# Patient Record
Sex: MALE | Race: WHITE | NOT HISPANIC OR LATINO | Employment: FULL TIME | URBAN - METROPOLITAN AREA
[De-identification: names, ages, dates, MRNs, and addresses within clinical notes are randomized per-mention and may not be internally consistent; named-entity substitution may affect disease eponyms.]

---

## 2022-05-09 ENCOUNTER — CLINICAL SUPPORT (OUTPATIENT)
Dept: SMOKING CESSATION | Facility: CLINIC | Age: 27
End: 2022-05-09

## 2022-05-09 DIAGNOSIS — F17.200 NICOTINE DEPENDENCE: Primary | ICD-10-CM

## 2022-05-09 PROCEDURE — 99404 PR PREVENT COUNSEL,INDIV,60 MIN: ICD-10-PCS | Mod: S$GLB,,, | Performed by: GENERAL PRACTICE

## 2022-05-09 PROCEDURE — 99404 PREV MED CNSL INDIV APPRX 60: CPT | Mod: S$GLB,,, | Performed by: GENERAL PRACTICE

## 2022-05-09 RX ORDER — VARENICLINE TARTRATE 1 MG/1
1 TABLET, FILM COATED ORAL 2 TIMES DAILY
Qty: 56 TABLET | Refills: 0 | Status: SHIPPED | OUTPATIENT
Start: 2022-05-09

## 2022-05-09 RX ORDER — VARENICLINE TARTRATE 0.5 MG/1
TABLET, FILM COATED ORAL
Qty: 11 TABLET | Refills: 0 | Status: SHIPPED | OUTPATIENT
Start: 2022-05-09

## 2022-05-23 ENCOUNTER — CLINICAL SUPPORT (OUTPATIENT)
Dept: SMOKING CESSATION | Facility: CLINIC | Age: 27
End: 2022-05-23

## 2022-05-23 DIAGNOSIS — F17.200 NICOTINE DEPENDENCE: Primary | ICD-10-CM

## 2022-05-23 PROCEDURE — 99403 PR PREVENT COUNSEL,INDIV,45 MIN: ICD-10-PCS | Mod: S$GLB,,, | Performed by: GENERAL PRACTICE

## 2022-05-23 PROCEDURE — 99403 PREV MED CNSL INDIV APPRX 45: CPT | Mod: S$GLB,,, | Performed by: GENERAL PRACTICE

## 2022-05-23 NOTE — PROGRESS NOTES
Individual Follow-Up Form    5/23/2022    Clinical Status of Patient: Outpatient    Length of Service: 45 minutes    Continuing Medication: yes  Chantix      Target Symptoms: Withdrawal and medication side effects. The following were  rated moderate (3) to severe (4) on TCRS:  · Moderate (3): none reported  · Severe (4): none reported    Comments: Spoke with patient at length in clinic regarding tobacco cessation progress. Patient remains on prescribed tobacco cessation medication 1mg Chantix without any negative side effects at this time.  Patient decreased to 6 cigarettes daily.  Patient will cut down to 5 cigarettes a day this week.  Counselor reviewed strategies, habitual behavior, high risks situations, understanding urges and cravings, stress and relaxation with open discussion and additional interventions, Introduced lapses, relapses, understanding them and analyzing the situation of a lapse, conflict issues that may be linked to a lapse.  Counselor suggested the patient set a daily smoking limit to help decrease the number of cigarettes he smokes daily.  Counselor also suggested staying hydrated and eating fruits and vegetables to help decrease nicotine cravings.  Patient appeared to be receptive to the information given.  Counselor will continue to motivate patient to be tobacco free.    Diagnosis: F17.200    Next Visit: 1 week

## 2022-06-06 ENCOUNTER — CLINICAL SUPPORT (OUTPATIENT)
Dept: SMOKING CESSATION | Facility: CLINIC | Age: 27
End: 2022-06-06

## 2022-06-06 DIAGNOSIS — F17.200 NICOTINE DEPENDENCE: Primary | ICD-10-CM

## 2022-06-06 PROCEDURE — 99403 PR PREVENT COUNSEL,INDIV,45 MIN: ICD-10-PCS | Mod: S$GLB,,, | Performed by: GENERAL PRACTICE

## 2022-06-06 PROCEDURE — 99403 PREV MED CNSL INDIV APPRX 45: CPT | Mod: S$GLB,,, | Performed by: GENERAL PRACTICE

## 2022-06-06 NOTE — PROGRESS NOTES
Individual Follow-Up Form    6/6/2022     Quit Date: 6/2/2022    Clinical Status of Patient: Outpatient    Length of Service: 45 minutes    Continuing Medication: yes  Chantix     Target Symptoms: Withdrawal and medication side effects. The following were  rated moderate (3) to severe (4) on TCRS:  · Moderate (3): none reported  · Severe (4): none reported    Comments: Spoke with patient at length in clinic regarding tobacco cessation progress. Patient remains on prescribed tobacco cessation medication 1mg Chantix without any negative side effects at this time.  Patient quit smoking on 6/2/22.  Patient's goal is to continue to remain tobacco free and use the skills learned to help address any future nicotine cravings.  Counselor reviewed strategies, habitual behavior, high risks situations, understanding urges and cravings, stress and relaxation with open discussion and additional interventions, Introduced lapses, relapses, understanding them and analyzing the situation of a lapse, conflict issues that may be linked to a lapse.  Patient stated that he no longer has the desire to smoke.  Occasionally he feels a craving when drinking alcohol.  Counselor discussed the relevance between alcohol and smoking.  Patient appeared to be receptive to the information given.  Counselor will continue to motivate patient to be tobacco free.     Diagnosis: F17.200    Next Visit: 1 week

## 2022-06-27 ENCOUNTER — CLINICAL SUPPORT (OUTPATIENT)
Dept: SMOKING CESSATION | Facility: CLINIC | Age: 27
End: 2022-06-27

## 2022-06-27 DIAGNOSIS — F17.200 NICOTINE DEPENDENCE: Primary | ICD-10-CM

## 2022-06-27 PROCEDURE — 99403 PR PREVENT COUNSEL,INDIV,45 MIN: ICD-10-PCS | Mod: S$GLB,,, | Performed by: GENERAL PRACTICE

## 2022-06-27 PROCEDURE — 99403 PREV MED CNSL INDIV APPRX 45: CPT | Mod: S$GLB,,, | Performed by: GENERAL PRACTICE

## 2022-06-27 NOTE — PROGRESS NOTES
Individual Follow-Up Form    6/27/2022    Quit Date: 6/2/2022    Clinical Status of Patient: Outpatient    Length of Service: 45 minutes    Continuing Medication: no    Comments: Spoke with patient at length in clinic regarding tobacco cessation progress.  Patient quit smoking on 6/2/22.  Patient will make home and vehicle tobacco free.  Patient's goal is to continue to remain tobacco free and use the skills learned to help address any future nicotine cravings.  Counselor reviewed strategies, habitual behavior, high risks situations, understanding urges and cravings, stress and relaxation with open discussion and additional interventions, Introduced lapses, relapses, understanding them and analyzing the situation of a lapse, conflict issues that may be linked to a lapse.  Patient appeared to be receptive to the information given.  Counselor will continue to motivate patient to remain tobacco free.    Diagnosis: F17.200    Next Visit: 2 weeks

## 2022-07-11 ENCOUNTER — CLINICAL SUPPORT (OUTPATIENT)
Dept: SMOKING CESSATION | Facility: CLINIC | Age: 27
End: 2022-07-11

## 2022-07-11 DIAGNOSIS — F17.200 NICOTINE DEPENDENCE: Primary | ICD-10-CM

## 2022-07-11 PROCEDURE — 99404 PR PREVENT COUNSEL,INDIV,60 MIN: ICD-10-PCS | Mod: S$GLB,,, | Performed by: GENERAL PRACTICE

## 2022-07-11 PROCEDURE — 99404 PREV MED CNSL INDIV APPRX 60: CPT | Mod: S$GLB,,, | Performed by: GENERAL PRACTICE

## 2022-07-11 NOTE — PROGRESS NOTES
Individual Follow-Up Form    7/11/2022    Quit Date: 6/2/2022    Clinical Status of Patient: Outpatient    Length of Service: 60 minutes    Continuing Medication: no    Comments:  Spoke with patient at length in clinic regarding tobacco cessation progress. Patient is currently not using prescribed tobacco cessation at this time.    Patient quit smoking on 6/2/22.  Patient's goal is to continue to remain tobacco free and use the skills learned to help address any future nicotine cravings.  Counselor reviewed strategies, habitual behavior, high risks situations, understanding urges and cravings, stress and relaxation with open discussion and additional interventions, Introduced lapses, relapses, understanding them and analyzing the situation of a lapse, conflict issues that may be linked to a lapse.  Counselor suggested that patient utilize jolly ranchers, dum dum lollipops, and toffee candy to help control nicotine cravings.  Counselor recommended that patient stay hydrated and eat fruits and vegetables to help control nicotine cravings.   Patient appeared to be receptive to the information given.  Counselor will continue to motivate patient to remain tobacco free.     Diagnosis: F17.200    Next Visit: 1 week

## 2022-07-18 ENCOUNTER — CLINICAL SUPPORT (OUTPATIENT)
Dept: SMOKING CESSATION | Facility: CLINIC | Age: 27
End: 2022-07-18

## 2022-07-18 DIAGNOSIS — F17.200 NICOTINE DEPENDENCE: Primary | ICD-10-CM

## 2022-07-18 PROCEDURE — 99403 PR PREVENT COUNSEL,INDIV,45 MIN: ICD-10-PCS | Mod: S$GLB,,, | Performed by: GENERAL PRACTICE

## 2022-07-18 PROCEDURE — 99403 PREV MED CNSL INDIV APPRX 45: CPT | Mod: S$GLB,,, | Performed by: GENERAL PRACTICE

## 2022-07-18 NOTE — PROGRESS NOTES
Individual Follow-Up Form    7/18/2022    Quit Date: 6/2/2022    Clinical Status of Patient: Outpatient    Length of Service: 45 minutes    Continuing Medication: no    Comments: Spoke with patient at length in clinic regarding tobacco cessation progress. Patient is currently not using prescribed tobacco cessation medication at this time.  Patient quit smoking on 6/2/22.  Patient's goal is to continue to remain tobacco free and use the skills learned to help address any future nicotine cravings.  Counselor reviewed strategies, cues, triggers, high risk situations, lapses, relapses, diet, exercise, stress, relaxation, sleep, habitual behavior, and life style changes.  Patient stated that he has noticed that he has more energy and his skin is brighter.  Patient also stated that his breathing has improved.   Patient blew a 2 COppm on the smokerlyzer.  Patient appeared to be receptive to the information given.  Counselor will continue to motivate patient to be tobacco free.    Diagnosis: F17.200    Next Visit: 1 week

## 2022-07-25 ENCOUNTER — CLINICAL SUPPORT (OUTPATIENT)
Dept: SMOKING CESSATION | Facility: CLINIC | Age: 27
End: 2022-07-25

## 2022-07-25 DIAGNOSIS — F17.200 NICOTINE DEPENDENCE: Primary | ICD-10-CM

## 2022-07-25 PROCEDURE — 99404 PREV MED CNSL INDIV APPRX 60: CPT | Mod: S$GLB,,, | Performed by: GENERAL PRACTICE

## 2022-07-25 PROCEDURE — 99404 PR PREVENT COUNSEL,INDIV,60 MIN: ICD-10-PCS | Mod: S$GLB,,, | Performed by: GENERAL PRACTICE

## 2022-07-25 NOTE — PROGRESS NOTES
Individual Follow-Up Form    7/25/2022    Quit Date: 6/2/2022    Clinical Status of Patient: Outpatient    Length of Service: 60 minutes    Continuing Medication: no    Comments:  Spoke with patient at length in clinic regarding tobacco cessation progress. Patient is currently not taking prescribed tobacco cessation medication at this time.  Patient quit smoking on 6/2/22.  Patient's goal is to continue to remain tobacco free and use the skills learned to help address any future nicotine cravings.  Counselor reviewed strategies, controlling environment, cues, triggers, new goals set. Introduced high risk situations with preparation interventions, Alcohol, Understanding urges, cravings, stress and relaxation. Open discussion with intervention discussion.  Patient stated that his urge to smoke with alcohol has decreased.  Patient stated that he is participating in positive activities to help occupy his time to avoid smoking.  Patient appeared to be receptive to the information given.  Counselor will continue to motivate patient to be tobacco free.    Diagnosis: F17.200    Next Visit: 1 week

## 2022-08-08 ENCOUNTER — CLINICAL SUPPORT (OUTPATIENT)
Dept: SMOKING CESSATION | Facility: CLINIC | Age: 27
End: 2022-08-08

## 2022-08-08 DIAGNOSIS — F17.200 NICOTINE DEPENDENCE: Primary | ICD-10-CM

## 2022-08-08 PROCEDURE — 99404 PR PREVENT COUNSEL,INDIV,60 MIN: ICD-10-PCS | Mod: S$GLB,,, | Performed by: GENERAL PRACTICE

## 2022-08-08 PROCEDURE — 99404 PREV MED CNSL INDIV APPRX 60: CPT | Mod: S$GLB,,, | Performed by: GENERAL PRACTICE

## 2022-08-08 NOTE — PROGRESS NOTES
Individual Follow-Up Form    8/8/2022    Quit Date: 6/2/2022    Clinical Status of Patient: Outpatient    Length of Service: 60 minutes    Continuing Medication: no    Comments: Spoke with patient at length in clinic regarding tobacco cessation progress. Patient is currently not taking prescribed tobacco cessation medication at this time.  Patient quit smoking on 6/2/22.  Patient's goal is to continue to remain tobacco free and use the skills learned to help address any future nicotine cravings.  Counselor reviewed strategies, habitual behavior, stress, and high risk situations. Introduced stress with addition interventions, SOLVE, relaxation with interventions, nutrition, exercise, weight gain, and the importance of rewarding oneself for accomplishments toward becoming tobacco free. Open discussion of all items with interventions.  Patient appeared to be receptive to the information given.  Counselor will continue to motivate patient to be tobacco free.    Diagnosis: F17.200    Next Visit: 1 week

## 2022-08-15 ENCOUNTER — CLINICAL SUPPORT (OUTPATIENT)
Dept: SMOKING CESSATION | Facility: CLINIC | Age: 27
End: 2022-08-15

## 2022-08-15 DIAGNOSIS — F17.200 NICOTINE DEPENDENCE: Primary | ICD-10-CM

## 2022-08-15 PROCEDURE — 99404 PREV MED CNSL INDIV APPRX 60: CPT | Mod: S$GLB,,, | Performed by: GENERAL PRACTICE

## 2022-08-15 PROCEDURE — 99404 PR PREVENT COUNSEL,INDIV,60 MIN: ICD-10-PCS | Mod: S$GLB,,, | Performed by: GENERAL PRACTICE

## 2022-08-15 NOTE — PROGRESS NOTES
Individual Follow-Up Form    8/15/2022    Quit Date: 6/2/2022    Clinical Status of Patient: Outpatient    Length of Service: 60 minutes    Continuing Medication: yes  Chantix     Target Symptoms: Withdrawal and medication side effects. The following were  rated moderate (3) to severe (4) on TCRS:  · Moderate (3): none reported  · Severe (4): none reported    Comments: Spoke with patient at length in clinic regarding tobacco cessation progress. Patient remains on prescribed tobacco cessation medication 1mg Chantx without any negative side effects at this time.  Patient quit smoking on 6/2/22.   Patient's goal is to continue to remain tobacco free and use the skills learned to help address any future nicotine cravings. Counselor reviewed strategies, cues, triggers, high risk situations, lapses, relapses, diet, exercise, stress, relaxation, sleep, habitual behavior, and life style changes.  Patient stated that he is no longer triggered to smoke by drinking alcohol.  Patient also stated that he is saving $250 a month by not smoking cigarettes.  Patient is choosing to save the extra money to eventually move to another house and go one dates with his wife.  Counselor recommended that patient stay hydrated and eat fruits and vegetables to help decrease nicotine cravings.   Patient appeared to be receptive to the information given.  Counselor will continue to motivate patient to be tobacco free.     Diagnosis: F17.200    Next Visit: 1 week

## 2022-08-18 ENCOUNTER — CLINICAL SUPPORT (OUTPATIENT)
Dept: SMOKING CESSATION | Facility: CLINIC | Age: 27
End: 2022-08-18

## 2022-08-18 DIAGNOSIS — F17.200 NICOTINE DEPENDENCE: Primary | ICD-10-CM

## 2022-08-18 PROCEDURE — 99406 PR TOBACCO USE CESSATION INTERMEDIATE 3-10 MINUTES: ICD-10-PCS | Mod: S$GLB,,,

## 2022-08-18 PROCEDURE — 99406 BEHAV CHNG SMOKING 3-10 MIN: CPT | Mod: S$GLB,,,

## 2022-08-18 NOTE — PROGRESS NOTES
Spoke with patient's wife today in regard to smoking cessation progress for 3 month phone follow up on Quit 1. Patient is tobacco free at this time, and she stated that they continue to work together on Quitting. Commended patient on their success and progress thus far.  Patient currently enrolled in program for Quit 1 and attends his wife's scheduled appointment with his CTTS.  Contact information available through wife.  Will complete smart form for 3 month follow up on Quit attempt # 1.

## 2022-08-29 ENCOUNTER — CLINICAL SUPPORT (OUTPATIENT)
Dept: SMOKING CESSATION | Facility: CLINIC | Age: 27
End: 2022-08-29

## 2022-08-29 DIAGNOSIS — F17.200 NICOTINE DEPENDENCE: Primary | ICD-10-CM

## 2022-08-29 PROCEDURE — 90853 GROUP PSYCHOTHERAPY: CPT | Mod: S$GLB,,, | Performed by: GENERAL PRACTICE

## 2022-08-29 PROCEDURE — 90853 PR GROUP PSYCHOTHERAPY: ICD-10-PCS | Mod: S$GLB,,, | Performed by: GENERAL PRACTICE

## 2022-08-29 NOTE — PROGRESS NOTES
Individual Follow-Up Form    8/29/2022    Quit Date: 6/2/2022    Clinical Status of Patient: Outpatient    Length of Service: 60 minutes    Continuing Medication: no    Comments: Spoke with patient at length in clinic regarding tobacco cessation progress. Patient is currently not using prescribed tobacco cessation medication at this time.  Patient quit smoking on 6/2/22.  Patient's goal is to continue to remain tobacco free and use the skills learned to help address any future nicotine cravings.  Counselor reviewed strategies, habitual behavior, high risks situations, understanding urges and cravings, stress and relaxation with open discussion and additional interventions, Introduced lapses, relapses, understanding them and analyzing the situation of a lapse, conflict issues that may be linked to a lapse.  Counselor also suggested that patient utilize jolly ranchers, dum dum lollipops, and toffee candy to help control nicotine cravings.  Counselor recommended that patient stay hydrated and eat fruits and vegetables to help decrease nicotine cravings.   Patient appeared to be receptive to the information given.  Counselor will continue to motivate patient to be tobacco free.     Diagnosis: F17.200    Next Visit: 2 weeks

## 2022-09-20 ENCOUNTER — CLINICAL SUPPORT (OUTPATIENT)
Dept: SMOKING CESSATION | Facility: CLINIC | Age: 27
End: 2022-09-20

## 2022-09-20 DIAGNOSIS — F17.200 NICOTINE DEPENDENCE: Primary | ICD-10-CM

## 2022-09-20 PROCEDURE — 90853 PR GROUP PSYCHOTHERAPY: ICD-10-PCS | Mod: S$GLB,,, | Performed by: GENERAL PRACTICE

## 2022-09-20 PROCEDURE — 90853 GROUP PSYCHOTHERAPY: CPT | Mod: S$GLB,,, | Performed by: GENERAL PRACTICE

## 2022-09-20 NOTE — PROGRESS NOTES
Individual Follow-Up Form    9/20/2022    Quit Date: 6/2/2022    Clinical Status of Patient: Outpatient    Length of Service: 45 minutes    Continuing Medication: no    Comments: Spoke with patient at length in clinic regarding tobacco cessation progress. Patient is not taking prescribed tobacco cessation medications at this time.  Patient quit smoking on 6/2/22.  Patient's goal is to continue to remain tobacco free and use the skills learned to help address any future nicotine cravings.  Counselor reviewed strategies, habitual behavior, high risks situations, understanding urges and cravings, stress and relaxation with open discussion and additional interventions, Introduced lapses, relapses, understanding them and analyzing the situation of a lapse, conflict issues that may be linked to a lapse.  Counselor suggested that patient utilize jolly ranchers, dum dum lollipops, and toffee candy to help control nicotine cravings.  Counselor recommended that patient stay hydrated and eat fruits and vegetables to help decrease nicotine cravings.   Patient appeared to be receptive to the information given.  Counselor will continue to motivate patient to remain tobacco free.     Diagnosis: F17.200    Next Visit: 1 week

## 2022-09-26 ENCOUNTER — CLINICAL SUPPORT (OUTPATIENT)
Dept: SMOKING CESSATION | Facility: CLINIC | Age: 27
End: 2022-09-26

## 2022-09-26 DIAGNOSIS — F17.200 NICOTINE DEPENDENCE: Primary | ICD-10-CM

## 2022-09-26 PROCEDURE — 90853 PR GROUP PSYCHOTHERAPY: ICD-10-PCS | Mod: S$GLB,,, | Performed by: GENERAL PRACTICE

## 2022-09-26 PROCEDURE — 90853 GROUP PSYCHOTHERAPY: CPT | Mod: S$GLB,,, | Performed by: GENERAL PRACTICE

## 2022-09-26 NOTE — PROGRESS NOTES
Individual Follow-Up Form    9/26/2022    Quit Date: 6/2/2022    Clinical Status of Patient: Outpatient    Length of Service: 45 minutes    Continuing Medication: no    Comments: Spoke with patient at length in clinic regarding tobacco cessation progress. Patient is currently not using prescribed tobacco cessation medication at this time.  Reviewed strategies, habitual behavior, high risks situations, understanding urges and cravings, stress and relaxation with open discussion and additional interventions, Introduced lapses, relapses, understanding them and analyzing the situation of a lapse, conflict issues that may be linked to a lapse.   Patient appeared to be receptive to the information given.  Counselor will continue to motivate patient to remain tobacco free.    Diagnosis: F17.200    Next Visit: 1 week

## 2022-10-11 ENCOUNTER — CLINICAL SUPPORT (OUTPATIENT)
Dept: SMOKING CESSATION | Facility: CLINIC | Age: 27
End: 2022-10-11

## 2022-10-11 DIAGNOSIS — F17.200 NICOTINE DEPENDENCE: Primary | ICD-10-CM

## 2022-10-11 PROCEDURE — 90853 GROUP PSYCHOTHERAPY: CPT | Mod: S$GLB,,, | Performed by: GENERAL PRACTICE

## 2022-10-11 PROCEDURE — 90853 PR GROUP PSYCHOTHERAPY: ICD-10-PCS | Mod: S$GLB,,, | Performed by: GENERAL PRACTICE

## 2022-10-11 NOTE — PROGRESS NOTES
Individual Follow-Up Form    10/11/2022    Quit Date: 6/2/2022    Clinical Status of Patient: Outpatient    Length of Service: 45 minutes    Continuing Medication: no    Comments: Spoke with patient at length in clinic regarding tobacco cessation progress. Patient is currently not using prescribed tobacco cessation medication at this time.  Patient quit smoking on 6/2/22.  Patient's goal is to continue to remain tobacco free and use the skills learned to help address any future nicotine cravings.  Reviewed strategies, habitual behavior, high risks situations, understanding urges and cravings, stress and relaxation with open discussion and additional interventions, Introduced lapses, relapses, understanding them and analyzing the situation of a lapse, conflict issues that may be linked to a lapse.  Counselor discussed the 4ds (delay, drink, distract, deep breathing) to address nicotine cravings.  Patient appeared to be receptive to the information given.  Counselor will continue to motivate patient to remain tobacco free.    Diagnosis: F17.200    Next Visit: 1 week

## 2022-10-18 ENCOUNTER — CLINICAL SUPPORT (OUTPATIENT)
Dept: SMOKING CESSATION | Facility: CLINIC | Age: 27
End: 2022-10-18

## 2022-10-18 DIAGNOSIS — F17.200 NICOTINE DEPENDENCE: Primary | ICD-10-CM

## 2022-10-18 PROCEDURE — 90853 GROUP PSYCHOTHERAPY: CPT | Mod: S$GLB,,, | Performed by: GENERAL PRACTICE

## 2022-10-18 PROCEDURE — 90853 PR GROUP PSYCHOTHERAPY: ICD-10-PCS | Mod: S$GLB,,, | Performed by: GENERAL PRACTICE

## 2022-10-18 NOTE — PROGRESS NOTES
Individual Follow-Up Form    10/18/2022    Quit Date: 6/2/2022    Clinical Status of Patient: Outpatient    Length of Service: 45 minutes    Continuing Medication: no     Target Symptoms: Withdrawal and medication side effects. The following were  rated moderate (3) to severe (4) on TCRS:  Moderate (3): None Reported  Severe (4): None Reported    Comments: Spoke with patient at length in clinic regarding tobacco cessation progress. Patient is currently not using prescribed tobacco cessation medication at this time.  Patient quit smoking on 6/2/22.  Patient's goal is to continue to remain tobacco free and use the skills learned to help address any future nicotine cravings.  Reviewed strategies, habitual behavior, high risks situations, understanding urges and cravings, stress and relaxation with open discussion and additional interventions, Introduced lapses, relapses, understanding them and analyzing the situation of a lapse, conflict issues that may be linked to a lapse.  Patient appeared to be receptive to the information given.  Counselor will continue to motivate patient to be tobacco free.    Diagnosis: F17.200    Next Visit: 1 week

## 2022-10-24 ENCOUNTER — CLINICAL SUPPORT (OUTPATIENT)
Dept: SMOKING CESSATION | Facility: CLINIC | Age: 27
End: 2022-10-24

## 2022-10-24 DIAGNOSIS — F17.200 NICOTINE DEPENDENCE: Primary | ICD-10-CM

## 2022-10-24 PROCEDURE — 90853 PR GROUP PSYCHOTHERAPY: ICD-10-PCS | Mod: S$GLB,,, | Performed by: GENERAL PRACTICE

## 2022-10-24 PROCEDURE — 90853 GROUP PSYCHOTHERAPY: CPT | Mod: S$GLB,,, | Performed by: GENERAL PRACTICE

## 2022-10-31 ENCOUNTER — CLINICAL SUPPORT (OUTPATIENT)
Dept: SMOKING CESSATION | Facility: CLINIC | Age: 27
End: 2022-10-31

## 2022-10-31 DIAGNOSIS — F17.200 NICOTINE DEPENDENCE: Primary | ICD-10-CM

## 2022-10-31 PROCEDURE — 90853 GROUP PSYCHOTHERAPY: CPT | Mod: S$GLB,,, | Performed by: GENERAL PRACTICE

## 2022-10-31 PROCEDURE — 90853 PR GROUP PSYCHOTHERAPY: ICD-10-PCS | Mod: S$GLB,,, | Performed by: GENERAL PRACTICE

## 2022-10-31 NOTE — PROGRESS NOTES
Individual Follow-Up Form    10/31/2022    Quit Date: 6/2/2022    Clinical Status of Patient: Outpatient    Length of Service: 45 minutes    Continuing Medication: no    Comments: Spoke with patient at length in clinic regarding tobacco cessation progress. Patient is currently not using prescribed tobacco cessation medication at this time.  Patient's goal is to continue to remain tobacco free and use the skills learned to help address any future nicotine cravings.  Reviewed strategies, habitual behavior, high risks situations, understanding urges and cravings, stress and relaxation with open discussion and additional interventions, Introduced lapses, relapses, understanding them and analyzing the situation of a lapse, conflict issues that may be linked to a lapse. Counselor discussed the 4ds (delay, drink, distract, deep breathing) to address nicotine cravings.  Patient stated that he has become sensitive to cigarette smoke since quitting.  Counselor discussed with the patient the positive outcomes that have resulted from him becoming tobacco free.  Counselor also discussed the financial advantages patient has experienced since quitting smoking and positive things that he can spend his money on instead.  Patient appeared to be receptive to the information given.  Counselor will continue to motivate patient to remain tobacco free.    Diagnosis: F17.200    Next Visit: 1 week

## 2023-05-09 ENCOUNTER — TELEPHONE (OUTPATIENT)
Dept: SMOKING CESSATION | Facility: CLINIC | Age: 28
End: 2023-05-09